# Patient Record
Sex: FEMALE | Race: WHITE | Employment: PART TIME | URBAN - METROPOLITAN AREA
[De-identification: names, ages, dates, MRNs, and addresses within clinical notes are randomized per-mention and may not be internally consistent; named-entity substitution may affect disease eponyms.]

---

## 2017-04-13 ENCOUNTER — HOSPITAL ENCOUNTER (OUTPATIENT)
Dept: MAMMOGRAPHY | Facility: HOSPITAL | Age: 47
Discharge: HOME | End: 2017-04-13
Attending: SURGERY
Payer: COMMERCIAL

## 2017-04-13 ENCOUNTER — HOSPITAL ENCOUNTER (OUTPATIENT)
Dept: ULTRASOUND IMAGING | Facility: HOSPITAL | Age: 47
Discharge: HOME | End: 2017-04-13
Attending: SURGERY
Payer: COMMERCIAL

## 2017-04-13 DIAGNOSIS — R92.1 CALCIFICATION OF LEFT BREAST: ICD-10-CM

## 2017-04-13 DIAGNOSIS — Z91.89 AT HIGH RISK FOR BREAST CANCER: ICD-10-CM

## 2017-04-13 DIAGNOSIS — R92.2 DENSE BREASTS: ICD-10-CM

## 2017-04-13 PROCEDURE — 76642 ULTRASOUND BREAST LIMITED: CPT | Performed by: RADIOLOGY

## 2017-04-13 PROCEDURE — 77066 DX MAMMO INCL CAD BI: CPT

## 2017-04-13 PROCEDURE — 77062 BREAST TOMOSYNTHESIS BI: CPT

## 2017-04-14 ENCOUNTER — TELEPHONE (OUTPATIENT)
Dept: SURGERY | Facility: CLINIC | Age: 47
End: 2017-04-14

## 2017-04-17 ENCOUNTER — TELEPHONE (OUTPATIENT)
Dept: SURGERY | Facility: CLINIC | Age: 47
End: 2017-04-17

## 2017-04-17 DIAGNOSIS — R92.2 DENSE BREAST: ICD-10-CM

## 2017-04-17 DIAGNOSIS — Z91.89 AT HIGH RISK FOR BREAST CANCER: ICD-10-CM

## 2017-04-17 DIAGNOSIS — Z12.39 BREAST CANCER SCREENING, HIGH RISK PATIENT: Primary | ICD-10-CM

## 2017-04-17 NOTE — TELEPHONE ENCOUNTER
Name & date of birth verified.  Pt notified that Dr. Dior Reese states that while the mammogram is stable, in light of the family history and leta extremely dense breasts with her calculated 27% lifetime risk of breast cancer, she is an ideal candidate for high

## 2017-09-05 PROCEDURE — 88175 CYTOPATH C/V AUTO FLUID REDO: CPT | Performed by: OBSTETRICS & GYNECOLOGY

## 2017-09-06 ENCOUNTER — LAB REQUISITION (OUTPATIENT)
Dept: LAB | Facility: HOSPITAL | Age: 47
End: 2017-09-06
Payer: COMMERCIAL

## 2017-09-06 DIAGNOSIS — Z01.419 ENCOUNTER FOR GYNECOLOGICAL EXAMINATION WITHOUT ABNORMAL FINDING: ICD-10-CM

## 2017-10-23 ENCOUNTER — HOSPITAL ENCOUNTER (OUTPATIENT)
Dept: MRI IMAGING | Facility: HOSPITAL | Age: 47
Discharge: HOME | End: 2017-10-23
Attending: SURGERY
Payer: COMMERCIAL

## 2017-10-23 DIAGNOSIS — R92.2 DENSE BREAST: ICD-10-CM

## 2017-10-23 DIAGNOSIS — Z12.39 BREAST CANCER SCREENING, HIGH RISK PATIENT: ICD-10-CM

## 2017-10-23 DIAGNOSIS — Z91.89 AT HIGH RISK FOR BREAST CANCER: ICD-10-CM

## 2017-10-23 PROCEDURE — A9575 INJ GADOTERATE MEGLUMI 0.1ML: HCPCS | Performed by: SURGERY

## 2017-10-23 PROCEDURE — 77059 MRI BREAST (W+WO) W/CAD BILAT (CPT=77059/0159T): CPT | Performed by: SURGERY

## 2017-10-23 PROCEDURE — 0159T MRI BREAST (W+WO) W/CAD BILAT (CPT=77059/0159T): CPT | Performed by: SURGERY

## 2017-10-24 ENCOUNTER — TELEPHONE (OUTPATIENT)
Dept: SURGERY | Facility: CLINIC | Age: 47
End: 2017-10-24

## 2017-10-24 NOTE — TELEPHONE ENCOUNTER
Reviewed with pt. That Dr Antoine Abarca reviewed her recent MRI, \"her MRI is normal. Janet Kearney will be due to see me for clinical exam before we schedule her next imaging which will be a mammogram due in April.  She should see me sooner with any new concerns. \"  Pt d

## 2018-03-07 ENCOUNTER — OFFICE VISIT (OUTPATIENT)
Dept: SURGERY | Facility: CLINIC | Age: 48
End: 2018-03-07

## 2018-03-07 VITALS
WEIGHT: 101.5 LBS | DIASTOLIC BLOOD PRESSURE: 83 MMHG | OXYGEN SATURATION: 100 % | HEIGHT: 64 IN | SYSTOLIC BLOOD PRESSURE: 125 MMHG | RESPIRATION RATE: 20 BRPM | HEART RATE: 88 BPM | BODY MASS INDEX: 17.33 KG/M2

## 2018-03-07 DIAGNOSIS — R92.2 DENSE BREAST: Primary | ICD-10-CM

## 2018-03-07 DIAGNOSIS — Z12.39 BREAST CANCER SCREENING, HIGH RISK PATIENT: ICD-10-CM

## 2018-03-07 PROCEDURE — 99214 OFFICE O/P EST MOD 30 MIN: CPT | Performed by: SURGERY

## 2018-03-08 NOTE — PROGRESS NOTES
High Risk Breast Cancer Screening and Prevention [facility]    History of Present Illness:   This is the first visit for this 52year old woman, referred by Dr. Celsa Beckham, who presents for breast cancer risk evaluation related to her family history, which is detail History   Problem Relation Age of Onset   • Breast Cancer Mother 39   • Cancer Father 40     colon ca; d.49   • Cancer Paternal Aunt      lung ca; smoker; d.60   • Cancer Paternal Uncle      esophageal ca; d.65, smoker   • Breast Cancer Paternal Cousin Fem stream, blood in the urine or vaginal/penile discharge. Skin:    The patient denies rash, itching, skin lesions, dry skin, change in skin color or change in moles.      Hematologic/Lymphatic:  The patient denies easily bruising or bleeding or persistent skin dimpling with movement of the pectoralis. There is no nipple retraction. No nipple discharge can be elicited. The parenchyma is mildly nodular.  There are no dominant masses in the breast. The axillary tail is normal.  Left breast:   The skin, nipple, additional family members that may be diagnosed with breast cancer. We then turned our discussion towards genetic counseling and testing. Based on the findings, she met with our genetic counselor to discuss this opportunity.  We discussed the implicati cancer risk evaluation with her over a 25 minute encounter, more than 50% of which was devoted to the discussion of management options.

## 2018-04-18 ENCOUNTER — HOSPITAL ENCOUNTER (OUTPATIENT)
Dept: MAMMOGRAPHY | Facility: HOSPITAL | Age: 48
Discharge: HOME | End: 2018-04-18
Attending: SURGERY
Payer: COMMERCIAL

## 2018-04-18 DIAGNOSIS — R92.2 DENSE BREAST: ICD-10-CM

## 2018-04-18 DIAGNOSIS — Z12.39 BREAST CANCER SCREENING, HIGH RISK PATIENT: ICD-10-CM

## 2018-04-18 PROCEDURE — 77067 SCR MAMMO BI INCL CAD: CPT | Performed by: SURGERY

## 2018-04-18 PROCEDURE — 77063 BREAST TOMOSYNTHESIS BI: CPT | Performed by: SURGERY

## 2018-04-19 ENCOUNTER — TELEPHONE (OUTPATIENT)
Dept: SURGERY | Facility: CLINIC | Age: 48
End: 2018-04-19

## 2018-04-19 NOTE — TELEPHONE ENCOUNTER
LVM letting patient know Dr Christina Gilman reviewed her recent mammogram and \"this looks good. She will not need imaging for a year unless she has new symptoms or her mom pursues the genetic testing and is found to be positive. \"  Asked to call the office with an

## 2018-05-17 ENCOUNTER — TELEPHONE (OUTPATIENT)
Dept: SURGERY | Facility: CLINIC | Age: 48
End: 2018-05-17

## 2018-05-17 NOTE — TELEPHONE ENCOUNTER
I returned the patient's call regarding her recent mammogram.   Her insurance did not cover the whole thing, saying it wasn't routine. I verified with  that it had the correct codes, screening, and that is what the MD ordered.    It was

## 2018-06-04 PROCEDURE — 88305 TISSUE EXAM BY PATHOLOGIST: CPT | Performed by: SPECIALIST

## 2019-02-11 ENCOUNTER — TELEPHONE (OUTPATIENT)
Dept: SURGERY | Facility: CLINIC | Age: 49
End: 2019-02-11

## 2019-02-11 NOTE — TELEPHONE ENCOUNTER
CAITY for patient addressing her questions regarding mammogram.   I let her know I reviewed her chart, and she is due an office visit in march, and mammogram in April. Asked her to call back to make an appt.

## 2019-04-18 ENCOUNTER — OFFICE VISIT (OUTPATIENT)
Dept: SURGERY | Facility: CLINIC | Age: 49
End: 2019-04-18
Payer: COMMERCIAL

## 2019-04-18 VITALS
OXYGEN SATURATION: 100 % | RESPIRATION RATE: 16 BRPM | DIASTOLIC BLOOD PRESSURE: 74 MMHG | HEIGHT: 64 IN | HEART RATE: 77 BPM | SYSTOLIC BLOOD PRESSURE: 118 MMHG | BODY MASS INDEX: 17.75 KG/M2 | WEIGHT: 104 LBS

## 2019-04-18 DIAGNOSIS — Z80.3 FAMILY HISTORY OF BREAST CANCER: Primary | ICD-10-CM

## 2019-04-18 DIAGNOSIS — R92.2 DENSE BREASTS: ICD-10-CM

## 2019-04-18 DIAGNOSIS — Z91.89 AT HIGH RISK FOR BREAST CANCER: ICD-10-CM

## 2019-04-18 PROCEDURE — 99214 OFFICE O/P EST MOD 30 MIN: CPT | Performed by: CLINICAL NURSE SPECIALIST

## 2019-04-18 NOTE — PROGRESS NOTES
High Risk Breast Cancer Screening and Prevention Clinic    History of Present Illness:   This is the first visit for this 50year old woman, referred by Dr. Kenton Hatfield, who presents for breast cancer risk evaluation related to her family history, which is detail has history of oral contraceptive use for 10 years, last unknown years ago. She denies infertility treatment to achieve pregnancy. Medications:      No current outpatient medications on file prior to visit.   No current facility-administered medications vomiting, dark or bloody stools, constipation, yellowing of the skin, indigestion, nausea, change in bowel habits, diarrhea, abdominal pain or vomiting blood.      Genitourinary:  The patient denies frequent urination, needing to get up at night to urinate, masses. The trachea is in the midline. Conjunctiva are clear, non-icteric. Chest: The chest expands symmetrically. The lungs are clear to auscultation. Heart: The rhythm is regular. There are no murmurs, rubs, gallops or thrills.     Breasts:  Her br today. I explained her breast cancer risk estimates to her and answered questions she had pertaining to her level of risk. The patient's current five-year risk for breast cancer is elevated when compared to her peer group.  We discussed factors that would has been encouraged to contact the office with any questions/concerns prior to her next appointment. I discussed Ms.  Opal Santos Rosas's breast cancer risk evaluation with her over a 25 minute encounter, more than 50% of which was devoted to the discussion

## 2019-04-23 DIAGNOSIS — Z12.39 SCREENING FOR BREAST CANCER: Primary | ICD-10-CM

## 2019-04-23 NOTE — PROGRESS NOTES
HPI:    Patient ID: Cash Knutson is a 50year old female. Radiology called to clarify diagnostic vs screening. Order updated to screening. Review of Systems         No current outpatient medications on file.   Allergies:No Known Allergies   PHY

## 2019-05-15 ENCOUNTER — HOSPITAL ENCOUNTER (OUTPATIENT)
Dept: MAMMOGRAPHY | Facility: HOSPITAL | Age: 49
Discharge: HOME | End: 2019-05-15
Attending: SURGERY
Payer: COMMERCIAL

## 2019-05-15 DIAGNOSIS — Z12.39 SCREENING FOR BREAST CANCER: ICD-10-CM

## 2019-05-15 PROCEDURE — 77067 SCR MAMMO BI INCL CAD: CPT | Performed by: SURGERY

## 2019-05-15 PROCEDURE — 77063 BREAST TOMOSYNTHESIS BI: CPT | Performed by: SURGERY

## 2020-06-24 ENCOUNTER — OFFICE VISIT (OUTPATIENT)
Dept: SURGERY | Facility: CLINIC | Age: 50
End: 2020-06-24
Payer: COMMERCIAL

## 2020-06-24 VITALS
WEIGHT: 104 LBS | DIASTOLIC BLOOD PRESSURE: 81 MMHG | HEART RATE: 80 BPM | OXYGEN SATURATION: 100 % | BODY MASS INDEX: 17.75 KG/M2 | HEIGHT: 64 IN | RESPIRATION RATE: 16 BRPM | SYSTOLIC BLOOD PRESSURE: 131 MMHG

## 2020-06-24 DIAGNOSIS — R92.2 DENSE BREASTS: ICD-10-CM

## 2020-06-24 DIAGNOSIS — Z12.31 SCREENING MAMMOGRAM FOR HIGH-RISK PATIENT: ICD-10-CM

## 2020-06-24 DIAGNOSIS — Z91.89 AT HIGH RISK FOR BREAST CANCER: ICD-10-CM

## 2020-06-24 DIAGNOSIS — Z80.3 FAMILY HISTORY OF BREAST CANCER: Primary | ICD-10-CM

## 2020-06-24 PROCEDURE — 99214 OFFICE O/P EST MOD 30 MIN: CPT | Performed by: SURGERY

## 2020-06-24 RX ORDER — VALACYCLOVIR HYDROCHLORIDE 1 G/1
TABLET, FILM COATED ORAL
COMMUNITY
Start: 2019-12-28

## 2020-06-24 RX ORDER — ACETAMINOPHEN 160 MG
2000 TABLET,DISINTEGRATING ORAL DAILY
COMMUNITY

## 2020-06-24 RX ORDER — PYRIDOXINE HCL (VITAMIN B6) 100 MG
TABLET ORAL
COMMUNITY

## 2020-06-24 NOTE — PROGRESS NOTES
High Risk Breast Cancer Screening and Prevention Clinic    History of Present Illness:   This is the first visit for this 52year old woman, referred by Dr. Andre Price, who presents for breast cancer risk evaluation related to her family history, which is detail Take by mouth., Disp: , Rfl:   valACYclovir HCl 1 G Oral Tab, , Disp: , Rfl:     No current facility-administered medications on file prior to visit.        Allergies:    No Known Allergies    Family History:  Family History   Problem Relation Age of Onset abdominal pain or vomiting blood.      Genitourinary:  The patient denies frequent urination, needing to get up at night to urinate, urinary hesitancy or retaining urine, painful urination, urinary incontinence, decreased urine stream, blood in the urine or lungs are clear to auscultation. Heart: The rhythm is regular. There are no murmurs, rubs, gallops or thrills.     Breasts:  Her breasts are symmetrical.  Right breast[de-identified] The skin, nipple ,and areola appear normal. There is no skin dimpling with movement patient's current five-year risk for breast cancer is elevated when compared to her peer group.  We discussed factors that would further increase her risk for breast cancer over time to include age, future breast biopsy, as well as additional family members ample opportunity for questions, and those questions were answered to her satisfaction. She will be due for her annual imaging now and I am recommending Bilateral 3D Sunday in light of her density and previous calcifications.  Her future surveillance will dep

## 2020-07-06 ENCOUNTER — HOSPITAL ENCOUNTER (OUTPATIENT)
Dept: MAMMOGRAPHY | Facility: HOSPITAL | Age: 50
Discharge: HOME | End: 2020-07-06
Attending: SURGERY
Payer: COMMERCIAL

## 2020-07-06 DIAGNOSIS — Z12.31 SCREENING MAMMOGRAM FOR HIGH-RISK PATIENT: ICD-10-CM

## 2020-07-06 PROCEDURE — 77067 SCR MAMMO BI INCL CAD: CPT | Performed by: SURGERY

## 2020-07-06 PROCEDURE — 77063 BREAST TOMOSYNTHESIS BI: CPT | Performed by: SURGERY

## 2020-11-11 ENCOUNTER — HOSPITAL ENCOUNTER (OUTPATIENT)
Dept: ULTRASOUND IMAGING | Facility: HOSPITAL | Age: 50
Discharge: HOME | End: 2020-11-11
Attending: SPECIALIST
Payer: COMMERCIAL

## 2020-11-11 DIAGNOSIS — N63.10 BREAST MASS, RIGHT: ICD-10-CM

## 2020-11-11 PROCEDURE — 76642 ULTRASOUND BREAST LIMITED: CPT | Performed by: SPECIALIST

## 2020-11-13 ENCOUNTER — TELEPHONE (OUTPATIENT)
Dept: SURGERY | Facility: CLINIC | Age: 50
End: 2020-11-13

## 2020-11-13 NOTE — TELEPHONE ENCOUNTER
Attempted to call pt back regarding breast concerns  No answer. LVM after verifying verbal release.   Informed pt that I had noticed that she had made an appt with us and then canceled it and was just checking in to make sure everything was alright  Provide

## 2021-07-26 ENCOUNTER — HOSPITAL ENCOUNTER (OUTPATIENT)
Dept: MAMMOGRAPHY | Facility: HOSPITAL | Age: 51
Discharge: HOME | End: 2021-07-26
Attending: SPECIALIST
Payer: COMMERCIAL

## 2021-07-26 DIAGNOSIS — Z12.31 ENCOUNTER FOR SCREENING MAMMOGRAM FOR MALIGNANT NEOPLASM OF BREAST: ICD-10-CM

## 2021-07-26 PROCEDURE — 77063 BREAST TOMOSYNTHESIS BI: CPT | Performed by: SPECIALIST

## 2021-07-26 PROCEDURE — 77067 SCR MAMMO BI INCL CAD: CPT | Performed by: SPECIALIST

## 2022-08-01 ENCOUNTER — HOSPITAL ENCOUNTER (OUTPATIENT)
Dept: MAMMOGRAPHY | Facility: HOSPITAL | Age: 52
Discharge: HOME | End: 2022-08-01
Attending: SPECIALIST
Payer: COMMERCIAL

## 2022-08-01 DIAGNOSIS — R92.2 DENSE BREAST TISSUE ON MAMMOGRAM: ICD-10-CM

## 2022-08-01 DIAGNOSIS — Z12.31 ENCOUNTER FOR SCREENING MAMMOGRAM FOR MALIGNANT NEOPLASM OF BREAST: ICD-10-CM

## 2022-08-01 PROCEDURE — 77067 SCR MAMMO BI INCL CAD: CPT | Performed by: SPECIALIST

## 2022-08-01 PROCEDURE — 77063 BREAST TOMOSYNTHESIS BI: CPT | Performed by: SPECIALIST

## 2022-08-24 ENCOUNTER — HOSPITAL ENCOUNTER (OUTPATIENT)
Dept: ULTRASOUND IMAGING | Facility: HOSPITAL | Age: 52
Discharge: HOME | End: 2022-08-24
Attending: SPECIALIST
Payer: COMMERCIAL

## 2022-08-24 DIAGNOSIS — R92.2 DENSE BREAST: ICD-10-CM

## 2022-08-24 PROCEDURE — 76641 ULTRASOUND BREAST COMPLETE: CPT | Performed by: SPECIALIST

## 2023-08-21 ENCOUNTER — HOSPITAL ENCOUNTER (OUTPATIENT)
Dept: MAMMOGRAPHY | Facility: HOSPITAL | Age: 53
Discharge: HOME | End: 2023-08-21
Attending: SPECIALIST
Payer: COMMERCIAL

## 2023-08-21 DIAGNOSIS — Z12.31 ENCOUNTER FOR MAMMOGRAM TO ESTABLISH BASELINE MAMMOGRAM: ICD-10-CM

## 2023-08-21 PROCEDURE — 77063 BREAST TOMOSYNTHESIS BI: CPT | Performed by: SPECIALIST

## 2023-08-21 PROCEDURE — 77067 SCR MAMMO BI INCL CAD: CPT | Performed by: SPECIALIST

## 2023-10-11 ENCOUNTER — HOSPITAL ENCOUNTER (OUTPATIENT)
Dept: ULTRASOUND IMAGING | Facility: HOSPITAL | Age: 53
Discharge: HOME | End: 2023-10-11
Attending: SPECIALIST
Payer: COMMERCIAL

## 2023-10-11 DIAGNOSIS — R92.30 DENSE BREAST TISSUE ON MAMMOGRAM: ICD-10-CM

## 2023-10-11 DIAGNOSIS — Z12.39 ENCOUNTER FOR OTHER SCREENING FOR MALIGNANT NEOPLASM OF BREAST: ICD-10-CM

## 2023-10-11 PROCEDURE — 76641 ULTRASOUND BREAST COMPLETE: CPT | Performed by: SPECIALIST

## 2024-08-26 ENCOUNTER — HOSPITAL ENCOUNTER (OUTPATIENT)
Dept: MAMMOGRAPHY | Facility: HOSPITAL | Age: 54
Discharge: HOME | End: 2024-08-26
Attending: SPECIALIST
Payer: COMMERCIAL

## 2024-08-26 DIAGNOSIS — Z12.31 ENCOUNTER FOR SCREENING MAMMOGRAM FOR MALIGNANT NEOPLASM OF BREAST: ICD-10-CM

## 2024-08-26 PROCEDURE — 77067 SCR MAMMO BI INCL CAD: CPT | Performed by: SPECIALIST

## 2024-08-26 PROCEDURE — 77063 BREAST TOMOSYNTHESIS BI: CPT | Performed by: SPECIALIST

## 2024-09-20 ENCOUNTER — HOSPITAL ENCOUNTER (OUTPATIENT)
Dept: BONE DENSITY | Age: 54
Discharge: HOME | End: 2024-09-20
Attending: SPECIALIST
Payer: COMMERCIAL

## 2024-09-20 DIAGNOSIS — M85.859 OSTEOPENIA OF HIP, UNSPECIFIED LATERALITY: ICD-10-CM

## 2024-09-20 DIAGNOSIS — M81.0 AGE RELATED OSTEOPOROSIS, UNSPECIFIED PATHOLOGICAL FRACTURE PRESENCE: ICD-10-CM

## 2024-09-20 PROCEDURE — 77080 DXA BONE DENSITY AXIAL: CPT | Performed by: SPECIALIST

## 2024-12-18 ENCOUNTER — HOSPITAL ENCOUNTER (OUTPATIENT)
Dept: ULTRASOUND IMAGING | Facility: HOSPITAL | Age: 54
Discharge: HOME | End: 2024-12-18
Attending: SPECIALIST
Payer: COMMERCIAL

## 2024-12-18 DIAGNOSIS — Z12.39 SCREENING BREAST EXAMINATION: ICD-10-CM

## 2024-12-18 DIAGNOSIS — R92.30 DENSE BREAST TISSUE ON MAMMOGRAM, UNSPECIFIED TYPE: ICD-10-CM

## 2024-12-18 PROCEDURE — 76641 ULTRASOUND BREAST COMPLETE: CPT | Performed by: SPECIALIST

## 2025-02-12 ENCOUNTER — OFFICE VISIT (OUTPATIENT)
Facility: CLINIC | Age: 55
End: 2025-02-12
Payer: COMMERCIAL

## 2025-02-12 VITALS
HEART RATE: 81 BPM | RESPIRATION RATE: 18 BRPM | SYSTOLIC BLOOD PRESSURE: 132 MMHG | DIASTOLIC BLOOD PRESSURE: 86 MMHG | OXYGEN SATURATION: 98 %

## 2025-02-12 DIAGNOSIS — R92.8 ABNORMAL ULTRASOUND OF BREAST: ICD-10-CM

## 2025-02-12 DIAGNOSIS — Z80.3 FAMILY HISTORY OF MALIGNANT NEOPLASM OF BREAST: Primary | ICD-10-CM

## 2025-02-13 PROBLEM — R92.8 ABNORMAL ULTRASOUND OF BREAST: Status: ACTIVE | Noted: 2025-02-13

## 2025-02-13 NOTE — PROGRESS NOTES
High Risk Breast Cancer Screening and Prevention Clinic    History of Present Illness:  This is a 54 year old woman, referred by Dr. Baxter, who presents for breast cancer risk evaluation related to her family history, which is detailed below.  The patient currently has not had any breast complaints at this time.  She denies specific breast lumps, nipple discharge, skin changes or other problems.  She has not had a prior history of breast disease or breast biopsy.  She has undergone prior regular imaging surveillance.  She was seen in the high-risk clinic remotely and did undergo an MRI at 1 point that was unremarkable.  She has been following most recently with her primary provider who has been ordering both an ultrasound and screening mammogram annually.  She states that her mom is planning to pursue genetic testing and that she has not had genetic testing herself.  Her bilateral screening ultrasound in 2023 was unremarkable.  She had a screening mammogram on 2024 that was also unremarkable with extremely dense breast tissue.  She was sent for a bilateral complete ultrasound on 2024 and noted to have a probable benign 5 mm mass in the right breast at 3:00, 2 cm from the nipple for which a 6-month surveillance was recommended.  She has no new concerns related to her breast exam.    Past Medical History:  History reviewed. No pertinent past medical history.    Past Surgical History:    Past Surgical History:   Procedure Laterality Date    Colonoscopy N/A 2021    Procedure: COLONOSCOPY w/ polypectomy;  Surgeon: Caroline Veloz MD;  [place] [facility]       Gynecological History:  Pt is a   Pt was 31 years old at time of first pregnancy.    She has cumulative breastfeeding history of unknown months, last unknown time ago.  She achieved menarche at age 13 and LMP     She has history of oral contraceptive use for 10 years, last unknown years ago.  She denies  infertility treatment to achieve pregnancy.    Medications:    Current Outpatient Medications on File Prior to Visit   Medication Sig Dispense Refill    Vitamin D3 50 MCG (2000 UT) Oral Cap Take 1 capsule (2,000 Units total) by mouth daily.      Calcium 500-125 MG-UNIT Oral Tab Take by mouth.       No current facility-administered medications on file prior to visit.       Allergies:    No Known Allergies    Family History:  Family History   Problem Relation Age of Onset    Breast Cancer Mother 45    Cancer Father 44        colon ca; d.49    Cancer Paternal Aunt         lung ca; smoker; d.60    Cancer Paternal Uncle         esophageal ca; d.65, smoker    Breast Cancer Paternal Cousin Female 46   She is not of [employer] ancestry.    Social History:  History   Alcohol use Not on file     History   Smoking Status    Never   Smokeless Tobacco    Never   The patient has 2 children. She is a dental hygienist     Review of Systems:  General: The patient denies, fever, chills, night sweats, fatigue, generalized weakness, change in appetite or weight loss.    HEENT:     The patient denies eye irritation, cataracts, redness, glaucoma, yellowing of the eyes, change in vision or color blindness. The patient denies hearing loss, ringing in the ears, ear drainage, earaches, nasal congestion, nose bleeds, snoring, pain in mouth/throat, hoarseness, change in voice, facial trauma.    Respiratory:  The patient denies chronic cough, phlegm, hemoptysis, pleurisy/chest pain, pneumonia, asthma, wheezing, difficulty in breathing with exertion, emphysema, chronic bronchitis, shortness of breath or abnormal sound when breathing.     Cardiovascular:  There is no history of chest pain, chest pressure/discomfort, palpitations, irregular heartbeat, fainting or near-fainting, difficulty breathing when lying flat, SOB/Coughing at night, swelling of the legs or chest pain while walking.    Breasts:  See history of present  illness    Gastrointestinal:     There is no history of difficulty or pain with swallowing, reflux symptoms, vomiting, dark or bloody stools, constipation, yellowing of the skin, indigestion, nausea, change in bowel habits, diarrhea, abdominal pain or vomiting blood.     Genitourinary:  The patient denies frequent urination, needing to get up at night to urinate, urinary hesitancy or retaining urine, painful urination, urinary incontinence, decreased urine stream, blood in the urine or vaginal/penile discharge.    Skin:    The patient denies rash, itching, skin lesions, dry skin, change in skin color or change in moles.     Hematologic/Lymphatic:  The patient denies easily bruising or bleeding or persistent swollen glands or lymph nodes.     Musculoskeletal:  The patient denies muscle aches/pain, joint pain, stiff joints, neck pain, back pain or bone pain.    Neuropsychiatric:  There is no history of migraines or severe headaches, seizure/epilepsy, speech problems, coordination problems, trembling/tremors, fainting/black outs, dizziness, memory problems, loss of sensation/numbness, problems walking, weakness, tingling or burning in hands/feet. There is no history of abusive relationship, bipolar disorder, sleep disturbance, anxiety, depression or feeling of despair.    Endocrine:    There is no history of poor/slow wound healing, weight loss/gain, fertility or hormone problems, cold intolerance, thyroid disease.     Allergic/Immunologic:  There is no history of hives, hay fever, angioedema or anaphylaxis.    /86   Pulse 81   Resp 18   SpO2 98%     Physical Exam:  The patient is an alert, oriented, well-nourished and  well-developed woman who appears her stated age. Her speech patterns and movements are normal. Her affect is appropriate.    HEENT: The head is normocephalic. The neck is supple. The thyroid is not enlarged and is without palpable masses/nodules. There are no palpable masses. The trachea is in  the midline. Conjunctiva are clear, non-icteric.    Chest: The chest expands symmetrically. The lungs are clear to auscultation.    Heart: The rhythm is regular.  There are no murmurs, rubs, gallops or thrills.    Breasts:  Her breasts are symmetrical.  Right breast: The skin, nipple ,and areola appear normal. There is no skin dimpling with movement of the pectoralis. There is no nipple retraction. No nipple discharge can be elicited. The parenchyma is mildly nodular. There are no dominant masses in the breast. The axillary tail is normal.  Left breast:   The skin, nipple, and areola appear normal. There is no skin dimpling with movement of the pectoralis. There is no nipple retraction. No nipple discharge can be elicited. The parenchyma is mildly nodular. There are no dominant masses in the breast. The axillary tail is normal.    Abdomen:  The abdomen is soft, flat and non tender. The liver is not enlarged. There are no palpable masses.    Lymph Nodes:  The supraclavicular, axillary and cervical regions are free of significant lymphadenopathy.    Back: There is no vertebral column tenderness.    Skin: The skin appears normal. There are no suspicious appearing rashes or lesions.    Extremities: The extremities are without deformity, cyanosis or edema.    Risk Estimate:  Probability of BRCA 1 or 2 alteration is:  1.1% using BRCA PRO, and 0.8% using Tyrer-Cuzick, and 2.6% using Myriad Prevalence Tables.    Sonal:  Five Year is:  1.7%.  Sonal Lifetime is:  18.1%.  BRCA Pro Lifetime is:  10.6%  Tyrer-Cuzick Five Year is: 2.6%  Tyrer-Cuzick Lifetime is: 27.2%    Assessment:  54 year old year old female with dense breasts, an imaging detected right breast nodule and an increased risk for breast cancer based on multiple risk assessment models as well as increased likelihood for a genetic mutation.     Discussion and Plan:  The patient has no clinical or radiographic evidence of malignancy on exam today. I explained her  breast cancer risk estimates to her and answered questions she had pertaining to her level of risk.  The patient's current five-year risk for breast cancer is elevated when compared to her peer group. We discussed factors that would further increase her risk for breast cancer over time to include age, future breast biopsy, as well as additional family members that may be diagnosed with breast cancer.     We then turned our discussion towards genetic counseling and testing.  Based on the findings, she met with our genetic counselor to discuss this opportunity. We discussed the implications of carrying a genetic mutation as well as both surveillance programs and surgical risk-reducing surgery.  The patient was informed that the most appropriate person to initiate testing would be her mother.  She will let us know the results of her mother's testing once they are available.      We then talked about surveillance and I recommended we will plan for the surveillance right breast ultrasound for the aforementioned nodule in June 2025.  She is due for her next screening mammogram in August 2025.  We will integrate MRI as needed pending the results of her mother's genetic testing results.       With regard to risk-reducing interventions, we discussed lifestyle modifications including attention to diet, exercise, weight control, moderation in alcohol use, and avoidance of long-term hormone replacement therapy in the future.  We also discussed the benefit of a cumulative time of eighteen months or more of breastfeeding.    The patient was given ample opportunity for questions, and those questions were answered to her satisfaction. She has been encouraged to contact the office with any questions/concerns prior to her next appointment.    I discussed Ms. Char Rosas's breast cancer risk evaluation with her over a 25 minute encounter, more than 50% of which was devoted to the discussion of management options.

## 2025-06-18 ENCOUNTER — HOSPITAL ENCOUNTER (OUTPATIENT)
Dept: ULTRASOUND IMAGING | Facility: HOSPITAL | Age: 55
Discharge: HOME | End: 2025-06-18
Attending: SURGERY
Payer: COMMERCIAL

## 2025-06-18 DIAGNOSIS — R92.8 ABNORMAL ULTRASOUND OF BREAST: ICD-10-CM

## 2025-06-18 PROCEDURE — 76642 ULTRASOUND BREAST LIMITED: CPT | Performed by: SURGERY

## 2025-07-09 DIAGNOSIS — Z12.31 SCREENING MAMMOGRAM, ENCOUNTER FOR: Primary | ICD-10-CM

## 2025-08-27 ENCOUNTER — HOSPITAL ENCOUNTER (OUTPATIENT)
Dept: MAMMOGRAPHY | Facility: HOSPITAL | Age: 55
Discharge: HOME | End: 2025-08-27
Attending: SURGERY

## 2025-08-27 DIAGNOSIS — Z12.31 SCREENING MAMMOGRAM, ENCOUNTER FOR: ICD-10-CM

## 2025-08-27 PROCEDURE — 77067 SCR MAMMO BI INCL CAD: CPT | Performed by: SURGERY

## 2025-08-27 PROCEDURE — 77063 BREAST TOMOSYNTHESIS BI: CPT | Performed by: SURGERY
